# Patient Record
(demographics unavailable — no encounter records)

---

## 2025-07-30 NOTE — DISCUSSION/SUMMARY
[Medication Risks Reviewed] : Medication risks reviewed [de-identified] : We discussed MRI of the knee, formal PT, a home exercise program, ice therapy and the role of NSAIDS and muscle relaxer for the pain. We discussed seeing Dr. Acevedo to discuss neck. These modalities will improve the patient's functionality in their activities of daily life.  Risks, benefits and contraindications were discussed.  The patient will follow up after MRI or sooner as needed.

## 2025-07-30 NOTE — HISTORY OF PRESENT ILLNESS
[de-identified] : Patient reports a fall down 7-8 steps at work. He states that he noticed water on the step and before he was able to avoid it the LT leg was slipping so he used his LT arm to hold on to the railing, jammed the shoulder and hit his head on the stairs. He was evaluated in the Urgent Care on the day of the accident where xrays of the ankle, elbow, forearm and shoulder were performed. He is c/o pain in the lateral knee and is unable to squat. He notes swelling in the LT ankle.  He is also c/o LT sided neck pain. He is not currently working